# Patient Record
Sex: FEMALE | URBAN - METROPOLITAN AREA
[De-identification: names, ages, dates, MRNs, and addresses within clinical notes are randomized per-mention and may not be internally consistent; named-entity substitution may affect disease eponyms.]

---

## 2021-12-27 ENCOUNTER — OFFICE VISIT (OUTPATIENT)
Dept: URBAN - METROPOLITAN AREA CLINIC 30 | Facility: CLINIC | Age: 61
End: 2021-12-27
Payer: COMMERCIAL

## 2021-12-27 DIAGNOSIS — H40.1134 PRIMARY OPEN-ANGLE GLAUCOMA, INDETERMINATE, BILATERAL: Primary | ICD-10-CM

## 2021-12-27 DIAGNOSIS — H04.123 TEAR FILM INSUFFICIENCY OF BILATERAL LACRIMAL GLANDS: ICD-10-CM

## 2021-12-27 PROCEDURE — 92134 CPTRZ OPH DX IMG PST SGM RTA: CPT | Performed by: OPTOMETRIST

## 2021-12-27 PROCEDURE — 92133 CPTRZD OPH DX IMG PST SGM ON: CPT | Performed by: OPTOMETRIST

## 2021-12-27 PROCEDURE — 92004 COMPRE OPH EXAM NEW PT 1/>: CPT | Performed by: OPTOMETRIST

## 2021-12-27 RX ORDER — LATANOPROST 50 UG/ML
0.005 % SOLUTION OPHTHALMIC
Qty: 7.5 | Refills: 3 | Status: ACTIVE
Start: 2021-12-27

## 2021-12-27 ASSESSMENT — INTRAOCULAR PRESSURE
OD: 15
OS: 15

## 2021-12-27 ASSESSMENT — VISUAL ACUITY
OD: 20/30
OS: 20/30

## 2021-12-27 ASSESSMENT — KERATOMETRY
OD: 45.07
OS: 44.73

## 2021-12-27 NOTE — IMPRESSION/PLAN
Impression: Primary open-angle glaucoma, indeterminate, bilateral: H40.1134. +MGM Tmax unknown, pt thinks IOPs on tx were 17-18 Plan: Diagnosed at [facility] in [place] [id number]. IOPs today, 15 OU. Pt is using latanoprost qhs OU since ~ 2014/2015. Cupping on exam, OD>OS. Will request previous records. 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

## 2021-12-27 NOTE — IMPRESSION/PLAN
Impression: Tear film insufficiency of bilateral lacrimal glands: H04.123. Plan: Start PF ATs qam increasing throughout the day prn. Using Lumify QAM. Discussed limiting use of Lumify to prn.

## 2022-04-25 ENCOUNTER — OFFICE VISIT (OUTPATIENT)
Dept: URBAN - METROPOLITAN AREA CLINIC 30 | Facility: CLINIC | Age: 62
End: 2022-04-25
Payer: COMMERCIAL

## 2022-04-25 PROCEDURE — 99214 OFFICE O/P EST MOD 30 MIN: CPT | Performed by: OPTOMETRIST

## 2022-04-25 PROCEDURE — 92083 EXTENDED VISUAL FIELD XM: CPT | Performed by: OPTOMETRIST

## 2022-04-25 RX ORDER — TIMOLOL MALEATE 5 MG/ML
0.5 % SOLUTION/ DROPS OPHTHALMIC
Qty: 5 | Refills: 5 | Status: ACTIVE
Start: 2022-04-25

## 2022-04-25 ASSESSMENT — INTRAOCULAR PRESSURE
OS: 20
OS: 18
OD: 20
OD: 17

## 2022-04-25 NOTE — IMPRESSION/PLAN
Impression: Primary open-angle glaucoma, indeterminate, bilateral: H40.1134. +MGM Denies lung dz Tmax unknown
denies sulfa allergy/ breathing difficulties Previous records: IOP ranged 14-16, Target set at 16
was on Lumigan in the past Plan: Diagnosed at [facility] in [place], [place]. IOPs higher today, 20OU, on latanoprost qhs OU since ~ 2014/2015. Cupping on exam, OD>OS. 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 Discussed could try other non PGA options. d/c latanoprost, start timolol BID OU. IOP check in 1 month.

## 2022-05-26 ENCOUNTER — OFFICE VISIT (OUTPATIENT)
Dept: URBAN - METROPOLITAN AREA CLINIC 30 | Facility: CLINIC | Age: 62
End: 2022-05-26
Payer: COMMERCIAL

## 2022-05-26 DIAGNOSIS — H40.1134 PRIMARY OPEN-ANGLE GLAUCOMA, INDETERMINATE, BILATERAL: Primary | ICD-10-CM

## 2022-05-26 DIAGNOSIS — H04.123 TEAR FILM INSUFFICIENCY OF BILATERAL LACRIMAL GLANDS: ICD-10-CM

## 2022-05-26 PROCEDURE — 99213 OFFICE O/P EST LOW 20 MIN: CPT | Performed by: OPTOMETRIST

## 2022-05-26 ASSESSMENT — INTRAOCULAR PRESSURE
OD: 18
OS: 18
OS: 16
OD: 16

## 2022-05-26 NOTE — IMPRESSION/PLAN
Impression: Primary open-angle glaucoma, indeterminate, bilateral: H40.1134. +MGM Denies lung dz Tmax unknown
denies sulfa allergy/ breathing difficulties Previous records: IOP ranged 14-16, Target set at 16
was on Lumigan in the past, recently latanoprost-stopped 4/22 visit due to redness. Plan: IOPs today 16 OU, on timolol BID OU trial. Notes somewhat less red after change from latanoprost to timolol. Diagnosed at [facility] in [place], [place]. Was on latanoprost qhs OU since ~ 2014/2015. Cupping on exam, OD>OS. 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 IOP check 4 months.

## 2022-05-26 NOTE — IMPRESSION/PLAN
Impression: Tear film insufficiency of bilateral lacrimal glands: H04.123. Plan: Rec PF ATs qam increasing throughout the day prn-sample Refresh Relieva PF. Using Lumify QAM. Discussed limiting use of Lumify to prn-or at least every other day. Discussed timolol contributes to dryness.

## 2022-09-22 ENCOUNTER — OFFICE VISIT (OUTPATIENT)
Dept: URBAN - METROPOLITAN AREA CLINIC 30 | Facility: CLINIC | Age: 62
End: 2022-09-22
Payer: COMMERCIAL

## 2022-09-22 DIAGNOSIS — H04.123 TEAR FILM INSUFFICIENCY OF BILATERAL LACRIMAL GLANDS: ICD-10-CM

## 2022-09-22 DIAGNOSIS — H40.1131 PRIMARY OPEN-ANGLE GLAUCOMA, MILD STAGE, BILATERAL: Primary | ICD-10-CM

## 2022-09-22 PROCEDURE — 99213 OFFICE O/P EST LOW 20 MIN: CPT | Performed by: OPTOMETRIST

## 2022-09-22 PROCEDURE — 92250 FUNDUS PHOTOGRAPHY W/I&R: CPT | Performed by: OPTOMETRIST

## 2022-09-22 ASSESSMENT — INTRAOCULAR PRESSURE
OD: 22
OS: 22

## 2022-09-22 NOTE — IMPRESSION/PLAN
Impression: Primary open-angle glaucoma, mild stage, bilateral: H40.1131. +MGM Denies lung dz Tmax unknown
denies sulfa allergy/ breathing difficulties Previous records: IOP ranged 14-16, Target set at 16
was on Lumigan in the past, recently latanoprost-stopped 4/22 visit due to redness. Plan: IOPs higher today 22 OU, on timolol BID OU trial. 

Diagnosed at [facility] in [place], [place]. Was on latanoprost qhs OU since ~ 2014/2015. Cupping on exam, OD>OS. RNFL 12/21 normal NFL OD, borderline NFL OS (77,75) VF 4/2022: low reliability OU, mild scatter OD and few inf/nasal defects OS Disc photos 9/22: no disc hemes. Continue timolol BID OU, IOP higher, if still high next visit or other signs of progression will likely trial Cosopt.

## 2022-09-22 NOTE — IMPRESSION/PLAN
Impression: Tear film insufficiency of bilateral lacrimal glands: H04.123. Plan: Has not been using ATs. Using Lumify QAM. Discussed limiting use of Lumify to prn-or at least every other day. Discussed timolol contributes to dryness. Resume use of ATs, has helped.

## 2023-01-05 ENCOUNTER — OFFICE VISIT (OUTPATIENT)
Dept: URBAN - METROPOLITAN AREA CLINIC 30 | Facility: CLINIC | Age: 63
End: 2023-01-05
Payer: COMMERCIAL

## 2023-01-05 DIAGNOSIS — H40.1131 PRIMARY OPEN-ANGLE GLAUCOMA, BILATERAL, MILD STAGE: Primary | ICD-10-CM

## 2023-01-05 DIAGNOSIS — H25.13 AGE-RELATED NUCLEAR CATARACT, BILATERAL: ICD-10-CM

## 2023-01-05 DIAGNOSIS — H04.123 TEAR FILM INSUFFICIENCY OF BILATERAL LACRIMAL GLANDS: ICD-10-CM

## 2023-01-05 PROCEDURE — 92133 CPTRZD OPH DX IMG PST SGM ON: CPT | Performed by: OPTOMETRIST

## 2023-01-05 PROCEDURE — 99214 OFFICE O/P EST MOD 30 MIN: CPT | Performed by: OPTOMETRIST

## 2023-01-05 RX ORDER — DORZOLAMIDE HYDROCHLORIDE AND TIMOLOL MALEATE 20; 5 MG/ML; MG/ML
SOLUTION/ DROPS OPHTHALMIC
Qty: 10 | Refills: 3 | Status: ACTIVE
Start: 2023-01-05

## 2023-01-05 ASSESSMENT — INTRAOCULAR PRESSURE
OS: 21
OS: 18
OD: 20
OD: 18

## 2023-01-05 ASSESSMENT — KERATOMETRY
OD: 45.50
OS: 45.25

## 2023-01-05 NOTE — IMPRESSION/PLAN
Impression: Primary open-angle glaucoma, mild stage, bilateral: H40.1131. +MGM Denies lung dz/ no sulfa allergy Tmax unknown
denies sulfa allergy/ breathing difficulties Previous records: IOP ranged 14-16, Target set at 16
was on Lumigan in the past, recently latanoprost-stopped 4/22 visit due to redness. Plan: IOPs lower today (20,21) post dilation, on timolol BID OU. IOPs remain too high. Discussed changing gtt vs SLT- R/B/A discussed in detail. Pt would like to consider SLT, she would like to think about it. In meantime, d/c timolol, trial generic Cosopt BID OU. Advised pt if she decides to proceed c laser, she can stay on timolol. Pt will contact office if decides to schedule laser, otherwise will need IOP check in 4-6 weeks if trials generic Cosopt BID OU. Diagnosed at [facility] in [place], [place]. Was on latanoprost qhs OU since ~ 2014/2015. Cupping on exam, OD>OS. RNFL 12/21 normal NFL OD, borderline NFL OS (77,75). RNFL 1/2023: borderline NFL OU, 72 OU, appears thinner OD, generally stable OS, lesser scan quality. VF 4/2022: low reliability OU, mild scatter OD and few inf/nasal defects OS Disc photos 9/22: no disc hemes.

## 2023-01-05 NOTE — IMPRESSION/PLAN
Impression: Tear film insufficiency of bilateral lacrimal glands: H04.123. Plan: Has not been using ATs. Using Lumify QAM. Discussed limiting use of Lumify to prn-or at least every other day. Discussed timolol contributes to dryness. Rec ATs QAM- QID OU.

## 2023-02-09 ENCOUNTER — OFFICE VISIT (OUTPATIENT)
Dept: URBAN - METROPOLITAN AREA CLINIC 30 | Facility: CLINIC | Age: 63
End: 2023-02-09
Payer: COMMERCIAL

## 2023-02-09 DIAGNOSIS — H40.1131 PRIMARY OPEN-ANGLE GLAUCOMA, MILD STAGE, BILATERAL: Primary | ICD-10-CM

## 2023-02-09 PROCEDURE — 99214 OFFICE O/P EST MOD 30 MIN: CPT | Performed by: OPTOMETRIST

## 2023-02-09 RX ORDER — LATANOPROST 50 UG/ML
0.005 % SOLUTION OPHTHALMIC
Qty: 5 | Refills: 5 | Status: ACTIVE
Start: 2023-02-09

## 2023-02-09 ASSESSMENT — INTRAOCULAR PRESSURE
OS: 21
OD: 21

## 2023-02-09 NOTE — IMPRESSION/PLAN
Impression: Primary open-angle glaucoma, mild stage, bilateral: H40.1131. +MGM Denies lung dz/ no sulfa allergy Tmax unknown
denies sulfa allergy/ breathing difficulties Previous records: IOP ranged 14-16, Target set at 16
was on Lumigan in the past, recently latanoprost-stopped 4/22 visit due to redness. Plan: IOPs stable to last at 21 OU on generic Cosopt BID OU trial. IOPs remains above target- poor response. Pt notes burning c generic Cosopt, uncomfortable. Changing gtt vs SLT- R/B/A discussed in detail at last visit. Pt would now like to schedule SLT and Durysta, pt educ on options. Diagnosed at [facility] in [place], [place]. Was on latanoprost qhs OU since ~ 2014/2015. Cupping on exam, OD>OS. RNFL 12/21 normal NFL OD, borderline NFL OS (77,75). RNFL 1/2023: borderline NFL OU, 72 OU, appears thinner OD, generally stable OS, lesser scan quality. VF 4/2022: low reliability OU, mild scatter OD and few inf/nasal defects OS Disc photos 9/22: no disc hemes. Proceed with scheduling SLT/Durysta OU c Dr Malaika Rivers. For now will resume latanoprost QHS OU, pt would like to be able to go off drops, chronic redness is bothersome.

## 2023-08-31 ENCOUNTER — OFFICE VISIT (OUTPATIENT)
Dept: URBAN - METROPOLITAN AREA CLINIC 30 | Facility: CLINIC | Age: 63
End: 2023-08-31
Payer: COMMERCIAL

## 2023-08-31 DIAGNOSIS — H40.1131 PRIMARY OPEN-ANGLE GLAUCOMA, MILD STAGE, BILATERAL: Primary | ICD-10-CM

## 2023-08-31 DIAGNOSIS — H04.123 TEAR FILM INSUFFICIENCY OF BILATERAL LACRIMAL GLANDS: ICD-10-CM

## 2023-08-31 PROCEDURE — 99213 OFFICE O/P EST LOW 20 MIN: CPT | Performed by: OPTOMETRIST

## 2023-08-31 ASSESSMENT — INTRAOCULAR PRESSURE
OS: 19
OD: 18

## 2023-12-15 ENCOUNTER — OFFICE VISIT (OUTPATIENT)
Dept: URBAN - METROPOLITAN AREA CLINIC 30 | Facility: CLINIC | Age: 63
End: 2023-12-15
Payer: COMMERCIAL

## 2023-12-15 PROCEDURE — 99213 OFFICE O/P EST LOW 20 MIN: CPT | Performed by: OPTOMETRIST

## 2023-12-15 ASSESSMENT — INTRAOCULAR PRESSURE
OD: 17
OS: 15

## 2024-02-08 ENCOUNTER — OFFICE VISIT (OUTPATIENT)
Dept: URBAN - METROPOLITAN AREA CLINIC 30 | Facility: CLINIC | Age: 64
End: 2024-02-08
Payer: COMMERCIAL

## 2024-02-08 DIAGNOSIS — H40.1131 PRIMARY OPEN-ANGLE GLAUCOMA, BILATERAL, MILD STAGE: Primary | ICD-10-CM

## 2024-02-08 PROCEDURE — 92014 COMPRE OPH EXAM EST PT 1/>: CPT | Performed by: OPTOMETRIST

## 2024-02-08 PROCEDURE — 92133 CPTRZD OPH DX IMG PST SGM ON: CPT | Performed by: OPTOMETRIST

## 2024-02-08 PROCEDURE — 92083 EXTENDED VISUAL FIELD XM: CPT | Performed by: OPTOMETRIST

## 2024-02-08 PROCEDURE — 92134 CPTRZ OPH DX IMG PST SGM RTA: CPT | Performed by: OPTOMETRIST

## 2024-02-08 ASSESSMENT — KERATOMETRY
OD: 45.15
OS: 44.82

## 2024-02-08 ASSESSMENT — INTRAOCULAR PRESSURE
OD: 20
OS: 20
OS: 15
OD: 17

## 2024-05-09 ENCOUNTER — OFFICE VISIT (OUTPATIENT)
Dept: URBAN - METROPOLITAN AREA CLINIC 30 | Facility: CLINIC | Age: 64
End: 2024-05-09
Payer: COMMERCIAL

## 2024-05-09 DIAGNOSIS — H40.1131 PRIMARY OPEN-ANGLE GLAUCOMA, MILD STAGE, BILATERAL: Primary | ICD-10-CM

## 2024-05-09 DIAGNOSIS — H52.4 PRESBYOPIA: ICD-10-CM

## 2024-05-09 PROCEDURE — 99213 OFFICE O/P EST LOW 20 MIN: CPT | Performed by: OPTOMETRIST

## 2024-05-09 ASSESSMENT — INTRAOCULAR PRESSURE
OS: 18
OS: 15
OD: 19
OD: 15

## 2024-09-12 ENCOUNTER — OFFICE VISIT (OUTPATIENT)
Dept: URBAN - METROPOLITAN AREA CLINIC 30 | Facility: CLINIC | Age: 64
End: 2024-09-12
Payer: COMMERCIAL

## 2024-09-12 DIAGNOSIS — H40.1131 PRIMARY OPEN-ANGLE GLAUCOMA, MILD STAGE, BILATERAL: Primary | ICD-10-CM

## 2024-09-12 PROCEDURE — 92250 FUNDUS PHOTOGRAPHY W/I&R: CPT | Performed by: OPTOMETRIST

## 2024-09-12 PROCEDURE — 99213 OFFICE O/P EST LOW 20 MIN: CPT | Performed by: OPTOMETRIST

## 2024-09-12 ASSESSMENT — INTRAOCULAR PRESSURE
OD: 16
OS: 16